# Patient Record
Sex: MALE | Race: WHITE | NOT HISPANIC OR LATINO | RURAL
[De-identification: names, ages, dates, MRNs, and addresses within clinical notes are randomized per-mention and may not be internally consistent; named-entity substitution may affect disease eponyms.]

---

## 2024-01-09 ENCOUNTER — OFFICE VISIT (OUTPATIENT)
Dept: CARDIOLOGY | Facility: CLINIC | Age: 34
End: 2024-01-09
Payer: COMMERCIAL

## 2024-01-09 VITALS
BODY MASS INDEX: 36.98 KG/M2 | HEIGHT: 68 IN | DIASTOLIC BLOOD PRESSURE: 82 MMHG | HEART RATE: 52 BPM | WEIGHT: 244 LBS | SYSTOLIC BLOOD PRESSURE: 150 MMHG

## 2024-01-09 DIAGNOSIS — R94.31 ABNORMAL EKG: Primary | ICD-10-CM

## 2024-01-09 PROCEDURE — 99204 OFFICE O/P NEW MOD 45 MIN: CPT | Performed by: INTERNAL MEDICINE

## 2024-01-09 RX ORDER — OMEPRAZOLE 20 MG/1
20 CAPSULE, DELAYED RELEASE ORAL DAILY
COMMUNITY

## 2024-01-09 NOTE — PROGRESS NOTES
Chief Complaint  Slow Heart Rate    Subjective            Delvis Hale presents to [facility] CARDIOLOGY  History of Present Illness    33-year-old white male.  He has no personal cardiac history.  His brother at age 43 did have a heart attack of some sort although I do not have the details of that.  The patient decided to get a checkup and went to a local primary provider.  He apparently had an EKG at that visit that was abnormal although we called the office and they did not have the tracing and it was not scanned into his chart here.  He does not have palpitations.  He denies chest pain or excessive shortness of breath.  He does have some fatigue.  He is relatively sedentary.  He is a non-smoker.    PMH  History reviewed. No pertinent past medical history.      SURGICALHX  History reviewed. No pertinent surgical history.     SOC  Social History     Socioeconomic History    Marital status: Single   Tobacco Use    Smoking status: Never    Smokeless tobacco: Never   Vaping Use    Vaping Use: Never used   Substance and Sexual Activity    Alcohol use: Yes     Comment: occ    Drug use: Defer    Sexual activity: Defer         FAMHX  Family History   Problem Relation Age of Onset    No Known Problems Mother     Bradycardia Father           ALLERGY  No Known Allergies     MEDSCURRENT    Current Outpatient Medications:     omeprazole (priLOSEC) 20 MG capsule, Take 1 capsule by mouth Daily., Disp: , Rfl:       Review of Systems   Constitutional: Positive for malaise/fatigue.   HENT: Negative.     Eyes: Negative.    Cardiovascular:  Positive for dyspnea on exertion. Negative for chest pain, palpitations and syncope.   Respiratory:  Positive for shortness of breath.    Endocrine: Negative.    Hematologic/Lymphatic: Negative.    Skin: Negative.    Musculoskeletal: Negative.    Gastrointestinal: Negative.    Genitourinary: Negative.    Neurological: Negative.    Psychiatric/Behavioral: Negative.       "    Objective     /82   Pulse 52   Ht 172.7 cm (68\")   Wt 111 kg (244 lb)   BMI 37.10 kg/m²       General Appearance:   well developed  well nourished  HENT:   oropharynx moist  lips not cyanotic  Neck:  thyroid not enlarged  supple  Respiratory:  no respiratory distress  normal breath sounds  no rales  Cardiovascular:  no jugular venous distention  regular rhythm  apical impulse normal  S1 normal, S2 normal  no S3, no S4   no murmur  no rub, no thrill  carotid pulses normal; no bruit  pedal pulses normal  lower extremity edema: none    Musculoskeletal:  no clubbing of fingers.   normocephalic, head atraumatic  Skin:   warm, dry  Psychiatric:  judgement and insight appropriate  normal mood and affect      Result Review :             Data reviewed : No primary care records review, the EKG tracing was unobtainable      Procedures                Assessment and Plan        ASSESSMENT:  Encounter Diagnosis   Name Primary?    Abnormal EKG Yes         PLAN:    1.  Reportedly abnormal EKG through a primary care office-we cannot obtain the tracing today.  The patient has a family history of myocardial infarction in his brother who was in his early 40s.  A treadmill test will be scheduled as well as a 4-day Holter monitor to evaluate the patient's heart rate which was apparently slow at the primary care office.  He is agreeable with this plan.  2.  His blood pressure is elevated today but he does not have a formal diagnosis of hypertension.  I told him to get a blood pressure cuff and monitor it over the next month or so and call if greater than 140/90 on average.  Otherwise I recommend low-sodium nutrition and attempts at weight loss.  3.  We will discuss the diagnostic results with the patient          Patient was given instructions and counseling regarding his condition or for health maintenance advice. Please see specific information pulled into the AVS if appropriate.             MAURO Jones, " MD  1/9/2024    14:42 EST

## 2024-01-17 ENCOUNTER — TELEPHONE (OUTPATIENT)
Dept: CARDIOLOGY | Facility: CLINIC | Age: 34
End: 2024-01-17
Payer: COMMERCIAL

## 2024-01-17 NOTE — TELEPHONE ENCOUNTER
Spoke with pt and adv  Per Dr. Jones : Heart monitor showed normal heart rhythm and rate, no significant arrhythmias     Await treadmill test         Pt verb understanding and no questions asked

## 2024-01-17 NOTE — TELEPHONE ENCOUNTER
----- Message from Kaye Vora RN sent at 1/16/2024  3:08 PM EST -----    ----- Message -----  From: MAURO Jones MD  Sent: 1/16/2024   2:59 PM EST  To: Kaye Vora RN    Heart monitor showed normal heart rhythm and rate, no significant arrhythmias    Await treadmill test

## 2024-01-18 DIAGNOSIS — R94.31 ABNORMAL EKG: ICD-10-CM

## 2024-01-19 ENCOUNTER — TELEPHONE (OUTPATIENT)
Dept: CARDIOLOGY | Facility: CLINIC | Age: 34
End: 2024-01-19
Payer: COMMERCIAL

## 2024-01-19 NOTE — TELEPHONE ENCOUNTER
Spoke with pt and adv per Dr. Jones : Heart monitor showed normal rhythm, very rare irregular beats which are not clinically significant overall the test was normal       Pt verb understanding and no questions asked

## 2024-01-19 NOTE — TELEPHONE ENCOUNTER
----- Message from Kaye Vora RN sent at 1/19/2024 12:56 PM EST -----    ----- Message -----  From: MAURO Jones MD  Sent: 1/19/2024  12:41 PM EST  To: Kaye Vora RN    Heart monitor showed normal rhythm, very rare irregular beats which are not clinically significant overall the test was normal

## 2024-01-19 NOTE — TELEPHONE ENCOUNTER
----- Message from MAURO Jones MD sent at 1/19/2024 12:44 PM EST -----  Treadmill test was normal, good functional capacity, no evidence of arrhythmia or coronary disease by treadmill criteria.  Overall low risk study    His cardiac workup was low risk, no additional testing is needed at this time.  Follow-up as needed

## 2025-03-06 ENCOUNTER — TELEPHONE (OUTPATIENT)
Dept: CARDIOLOGY | Facility: CLINIC | Age: 35
End: 2025-03-06

## 2025-03-06 NOTE — TELEPHONE ENCOUNTER
Caller: HEIDI CHANEY NP    Relationship: PCP    Best call back number: [phone]    What form or medical record are you requesting: LAST PROG NOTE FROM DR. IZQUIERDO 1.9.25    Who is requesting this form or medical record from you: PT'S PCP, HEIDI CHANEY NP WITH [facility]    How would you like to receive the form or medical records (pick-up, mail, fax): FAX  If fax, what is the fax number: [phone]    Timeframe paperwork needed: ASAP